# Patient Record
Sex: MALE | Race: WHITE | NOT HISPANIC OR LATINO | URBAN - METROPOLITAN AREA
[De-identification: names, ages, dates, MRNs, and addresses within clinical notes are randomized per-mention and may not be internally consistent; named-entity substitution may affect disease eponyms.]

---

## 2017-09-27 ENCOUNTER — OUTPATIENT (OUTPATIENT)
Dept: OUTPATIENT SERVICES | Facility: HOSPITAL | Age: 32
LOS: 1 days | Discharge: HOME | End: 2017-09-27

## 2017-09-29 DIAGNOSIS — F10.20 ALCOHOL DEPENDENCE, UNCOMPLICATED: ICD-10-CM

## 2021-04-11 ENCOUNTER — EMERGENCY (EMERGENCY)
Facility: HOSPITAL | Age: 36
LOS: 1 days | Discharge: HOME | End: 2021-04-11
Attending: STUDENT IN AN ORGANIZED HEALTH CARE EDUCATION/TRAINING PROGRAM | Admitting: STUDENT IN AN ORGANIZED HEALTH CARE EDUCATION/TRAINING PROGRAM
Payer: COMMERCIAL

## 2021-04-11 VITALS
SYSTOLIC BLOOD PRESSURE: 129 MMHG | RESPIRATION RATE: 16 BRPM | TEMPERATURE: 98 F | HEART RATE: 71 BPM | DIASTOLIC BLOOD PRESSURE: 87 MMHG | OXYGEN SATURATION: 99 %

## 2021-04-11 VITALS
RESPIRATION RATE: 16 BRPM | SYSTOLIC BLOOD PRESSURE: 156 MMHG | DIASTOLIC BLOOD PRESSURE: 106 MMHG | OXYGEN SATURATION: 98 % | TEMPERATURE: 98 F | HEART RATE: 77 BPM

## 2021-04-11 PROCEDURE — 99284 EMERGENCY DEPT VISIT MOD MDM: CPT

## 2021-04-11 RX ORDER — HUMAN RABIES VIRUS IMMUNE GLOBULIN 150 [IU]/ML
1400 INJECTION, SOLUTION INTRAMUSCULAR ONCE
Refills: 0 | Status: COMPLETED | OUTPATIENT
Start: 2021-04-11 | End: 2021-04-11

## 2021-04-11 RX ORDER — ACETAMINOPHEN 500 MG
650 TABLET ORAL ONCE
Refills: 0 | Status: COMPLETED | OUTPATIENT
Start: 2021-04-11 | End: 2021-04-11

## 2021-04-11 RX ORDER — TETANUS AND DIPHTHERIA TOXOIDS ADSORBED 2; 2 [LF]/.5ML; [LF]/.5ML
0.5 INJECTION INTRAMUSCULAR ONCE
Refills: 0 | Status: COMPLETED | OUTPATIENT
Start: 2021-04-11 | End: 2021-04-11

## 2021-04-11 RX ORDER — HUMAN RABIES VIRUS IMMUNE GLOBULIN 150 [IU]/ML
1400 INJECTION, SOLUTION INTRAMUSCULAR ONCE
Refills: 0 | Status: DISCONTINUED | OUTPATIENT
Start: 2021-04-11 | End: 2021-04-11

## 2021-04-11 RX ORDER — RABIES VACC, HUMAN DIPLOID/PF 2.5 UNIT
1 VIAL (EA) INTRAMUSCULAR ONCE
Refills: 0 | Status: COMPLETED | OUTPATIENT
Start: 2021-04-11 | End: 2021-04-11

## 2021-04-11 RX ADMIN — HUMAN RABIES VIRUS IMMUNE GLOBULIN 1400 UNIT(S): 150 INJECTION, SOLUTION INTRAMUSCULAR at 15:52

## 2021-04-11 RX ADMIN — Medication 1 MILLILITER(S): at 13:52

## 2021-04-11 RX ADMIN — TETANUS AND DIPHTHERIA TOXOIDS ADSORBED 0.5 MILLILITER(S): 2; 2 INJECTION INTRAMUSCULAR at 13:21

## 2021-04-11 RX ADMIN — Medication 1 TABLET(S): at 13:21

## 2021-04-11 RX ADMIN — Medication 650 MILLIGRAM(S): at 13:21

## 2021-04-11 NOTE — ED PROVIDER NOTE - PATIENT PORTAL LINK FT
You can access the FollowMyHealth Patient Portal offered by  by registering at the following website: [email or web address]. By joining [employer]’s FollowMyHealth portal, you will also be able to view your health information using other applications (apps) compatible with our system.

## 2021-04-11 NOTE — ED PROVIDER NOTE - CLINICAL SUMMARY MEDICAL DECISION MAKING FREE TEXT BOX
healthy 36M p/w unprovoked dog bite, pt is rabies immunized but dog's rabies vaccine recently . No evidence of bony injury, is neurovascularly intact. Plan to give prophylactic antibiotics, rabies vaccine, tetanus shot, discharge w/ instructions to return for repeat rabies immunization.

## 2021-04-11 NOTE — ED PROVIDER NOTE - NSFOLLOWUPINSTRUCTIONS_ED_ALL_ED_FT
You have received your initial dose of rabies vaccine on 4/11. Return for 3 further repeat doses after the following number of days: 3, 7, and 14.     Return to the ER immediately if your symptoms worsen, or experience the following issues: worsening pain, redness, and swelling to the wound sites, or drainage of yellow/brown pus.

## 2021-04-11 NOTE — ED ADULT TRIAGE NOTE - CHIEF COMPLAINT QUOTE
pt c/o pain to R hand s/p dog bite this morning. radial pulse intact, sensory intact, fingers with full ROM. hand appears swollen, 2 puncture wounds noted to hand, no active bleeding. no past medical hx.

## 2021-04-11 NOTE — ED PROVIDER NOTE - OBJECTIVE STATEMENT
36M w/out pmh - p/w R hand bite injury. Pt was talking with his neighbor and his neighbor's dog ran up and bit him in his R hand. Has had bleeding and pain since then. Pt was given information that the dog's rabies vaccine  21, but does not have any information about the dog's recent behavior / status. Pt reports previously received rabies immunization. No fever, n/v/d/c, chest / abd pain, cough, sob, dizziness, dysuria/hematuria. No recent travel, medication change, illness, or hospitalization.

## 2021-04-11 NOTE — ED PROVIDER NOTE - PHYSICAL EXAMINATION
*GEN:   uncomfortable, AOx3  *EYES:   pupils equally round and reactive to light, extra-occular movements intact  *HEENT:   airway patent  *CV:   regular rate and rhythm  *RESP:   non-labored  *EXTREM:   R hand dorsal puncture wound with TTP, significant overlying swelling, also smaller puncture to R thenar eminence. no erythema. R hand strength/sensation/perfusion intact throughout  *SKIN:   dry, intact  *NEURO:   AOx3, no focal weakness or loss of sensation

## 2021-04-14 ENCOUNTER — EMERGENCY (EMERGENCY)
Facility: HOSPITAL | Age: 36
LOS: 1 days | Discharge: HOME | End: 2021-04-14
Attending: EMERGENCY MEDICINE | Admitting: EMERGENCY MEDICINE
Payer: COMMERCIAL

## 2021-04-14 VITALS
TEMPERATURE: 98 F | OXYGEN SATURATION: 98 % | HEART RATE: 85 BPM | DIASTOLIC BLOOD PRESSURE: 65 MMHG | SYSTOLIC BLOOD PRESSURE: 135 MMHG | RESPIRATION RATE: 18 BRPM

## 2021-04-14 PROCEDURE — 99283 EMERGENCY DEPT VISIT LOW MDM: CPT

## 2021-04-14 RX ORDER — RABIES VACC, HUMAN DIPLOID/PF 2.5 UNIT
1 VIAL (EA) INTRAMUSCULAR ONCE
Refills: 0 | Status: COMPLETED | OUTPATIENT
Start: 2021-04-14 | End: 2021-04-14

## 2021-04-14 RX ADMIN — Medication 1 MILLILITER(S): at 09:08

## 2021-04-14 NOTE — ED PROVIDER NOTE - PATIENT PORTAL LINK FT
You can access the FollowMyHealth Patient Portal offered by [facility] by registering at the following website: [email or web address]. By joining [employer]’s FollowMyHealth portal, you will also be able to view your health information using other applications (apps) compatible with our system.

## 2021-04-14 NOTE — ED PROVIDER NOTE - PRINCIPAL DIAGNOSIS
Rik notified of Dr James's recommendations regarding his B/P and elevated pulse. He is in agreement to all. FBL appt scheduled.   Rabies, need for prophylactic vaccination against

## 2021-04-14 NOTE — ED PROVIDER NOTE - CLINICAL SUMMARY MEDICAL DECISION MAKING FREE TEXT BOX
- will dose 2nd rabies vaccine  - given return precautions for signs of cellulitis  - instructed to return for rest of series

## 2021-04-14 NOTE — ED PROVIDER NOTE - OBJECTIVE STATEMENT
36M denies pmh presents for second dose of rabies vaccine series.  Pt was bitten 3 days ago by neighbor's dog in right hand (dominant hand).  Event unprovoked, dog was vaccinated against rabies but reportedly vaccine .  Received his first rabies vaccine, rabies immunoglobulin, and tetanus shows in ED on prior visit.  Still with swelling, pain and redness of right hand but improved compared to previous day.  Taking Augmentin.

## 2021-04-14 NOTE — ED PROVIDER NOTE - NSFOLLOWUPINSTRUCTIONS_ED_ALL_ED_FT
Continue taking prescribed antibiotics.    Return to the emergency department for subsequent doses on days 7 and 14 from day of dog bite:    4/18 and 4/25    If swelling/redness/pain significantly worsen in right hand, please seek medical doctor for further evaluation as that may be a sign of infection.

## 2021-04-14 NOTE — ED ADULT TRIAGE NOTE - CHIEF COMPLAINT QUOTE
p/t s/p dog bite to rt hand few days ago, here for second rabies vaccine, c/o of mild swelling to rt hand, good cap refill noted

## 2021-04-15 ENCOUNTER — EMERGENCY (EMERGENCY)
Facility: HOSPITAL | Age: 36
LOS: 1 days | Discharge: HOME | End: 2021-04-15
Attending: EMERGENCY MEDICINE | Admitting: EMERGENCY MEDICINE
Payer: COMMERCIAL

## 2021-04-15 VITALS
SYSTOLIC BLOOD PRESSURE: 131 MMHG | HEART RATE: 100 BPM | OXYGEN SATURATION: 99 % | TEMPERATURE: 99 F | RESPIRATION RATE: 18 BRPM | DIASTOLIC BLOOD PRESSURE: 98 MMHG

## 2021-04-15 PROCEDURE — 99283 EMERGENCY DEPT VISIT LOW MDM: CPT

## 2021-04-15 NOTE — ED ADULT TRIAGE NOTE - CHIEF COMPLAINT QUOTE
Pt st" I was seen and treated for dog bit on rt hand on Sunday came back yesterday for wound and 2nd Rabies vaccine told to watch for infection the hand swelling seems to have gone down but I noticed reddness traveling up arm now past the wrist area...which yesterday it was not past wrist. "

## 2021-04-15 NOTE — ED PROVIDER NOTE - OBJECTIVE STATEMENT
Attending/Katerina: 37 yo M s/p bite to rt hand by neighbors dog. Rabies vaccination had  , ED started Rabies Ig/vaccine. Yesterday had received 2nd dose of vaccine. Tonight he presents for wound check over his wife's concern on whether erythema has increased. Pt reports decrease swelling, pain ad believes erythema has not worsen. Pt had received a 5 day course of Augmentin with last dose tonight.

## 2021-04-15 NOTE — ED PROVIDER NOTE - NSFOLLOWUPINSTRUCTIONS_ED_ALL_ED_FT
Continue Augmentin 875 mg twice a day for additional five days    Keep arm elevated     If redness worsens please return back to the emergency department    Please return for your next rabies vaccine dose and wound check

## 2021-04-15 NOTE — ED PROVIDER NOTE - PHYSICAL EXAMINATION
Attending/Katerina: Well-appearing, RRR, CTAB, Abd-soft, NT/ND; A&Ox3, nonfocal; RUE-+swelling of rt hand, erythema to prox wrist, no PT, FROM, sensory/motor intact

## 2021-04-15 NOTE — ED PROVIDER NOTE - PROGRESS NOTE DETAILS
Katerina: D/w dispo plan with patient including continuing abx, return for rabies vaccine, wound check. Area of erythema was marked with surgical marker

## 2021-04-15 NOTE — ED PROVIDER NOTE - PATIENT PORTAL LINK FT
You can access the FollowMyHealth Patient Portal offered by [facility] by registering at the following website: [email or web address]. By joining [employer]’s FollowMyHealth portal, you will also be able to view your health information using other applications (apps) compatible with our system.

## 2021-04-18 ENCOUNTER — EMERGENCY (EMERGENCY)
Facility: HOSPITAL | Age: 36
LOS: 1 days | Discharge: HOME | End: 2021-04-18
Attending: EMERGENCY MEDICINE | Admitting: EMERGENCY MEDICINE
Payer: COMMERCIAL

## 2021-04-18 VITALS
HEART RATE: 66 BPM | TEMPERATURE: 97 F | SYSTOLIC BLOOD PRESSURE: 117 MMHG | DIASTOLIC BLOOD PRESSURE: 77 MMHG | RESPIRATION RATE: 16 BRPM | OXYGEN SATURATION: 100 %

## 2021-04-18 PROBLEM — Z78.9 OTHER SPECIFIED HEALTH STATUS: Chronic | Status: ACTIVE | Noted: 2021-04-15

## 2021-04-18 PROCEDURE — 99283 EMERGENCY DEPT VISIT LOW MDM: CPT

## 2021-04-18 PROCEDURE — 73130 X-RAY EXAM OF HAND: CPT | Mod: 26,RT

## 2021-04-18 RX ORDER — RABIES VACC, HUMAN DIPLOID/PF 2.5 UNIT
1 VIAL (EA) INTRAMUSCULAR ONCE
Refills: 0 | Status: COMPLETED | OUTPATIENT
Start: 2021-04-18 | End: 2021-04-18

## 2021-04-18 RX ADMIN — Medication 1 MILLILITER(S): at 08:22

## 2021-04-18 NOTE — ED PROVIDER NOTE - OBJECTIVE STATEMENT
36m presents to ed for 3rd rabies vaccine. 36m presents to ed for 3rd rabies vaccine. Patient was bit by dog on 4/11/21, rec first rabies dose and then dose on day 3. Now here for day 7 dose. Patient states he intially had significant swelling/pain to region he was bitten. Has been slowly improving. Had his abx course extended to 10d on prior visit. Of note, he say his swelling has been improving, his pain has been improving, redness has been present and remains but does not extend out of borders of skin marker line drawn. No fevers, chills, numbness, tingling, difficulty moving fingers.

## 2021-04-18 NOTE — ED PROVIDER NOTE - CARE PROVIDER_API CALL
Lit Lin)  Orthopaedic Surgery; Surgery of the Hand  [address], [address]  [place] [address]  Phone: [phone]  Fax: [phone]  Follow Up Time:

## 2021-04-18 NOTE — ED PROVIDER NOTE - CLINICAL SUMMARY MEDICAL DECISION MAKING FREE TEXT BOX
Patient presents to the ED for third rabies vaccine in series (already received first 2 and rabies IG after a dog bite to r. hand. Has also been on augmentin 9 days. States his r. hand has been slowly improving in symptoms, redness still present but has not extended out of line previously drawn, pain has improved, swelling improving, range of motion improving since initial injury. no fevers, chills, vomiting. Given persistent redness, will add clindamycin coverage and recommend expeditious hand f/u. Given improvement of symptom overall, no systemic symptoms, nl vs, afebrile, no neuro or vascular deficits, will dc and give very strict return precautions. Patient presents to the ED for third rabies vaccine in series (already received first 2 and rabies IG after a dog bite to r. hand. Has also been on augmentin. States his r. hand has been slowly improving in symptoms, redness still present but has not extended out of line previously drawn, pain has improved, swelling improving, range of motion improving since initial injury. no fevers, chills, vomiting. Given persistent redness, will add clindamycin coverage, extend augmentin to full course, and recommend expeditious hand f/u. Given improvement of symptom overall, no systemic symptoms, nl vs, afebrile, no neuro or vascular deficits, will dc and give very strict return precautions.

## 2021-04-18 NOTE — ED PROVIDER NOTE - NSFOLLOWUPINSTRUCTIONS_ED_ALL_ED_FT
Please take ibuprofen 400mg every 6 hours as needed for pain with food.  Please take tylenol 650mg every 4 hours as needed for pain.  Please follow up with the hand specialist in the next few days for reevaluation. Information below. If unable to get appointment, please have your regular doctor or a health care provider reassess the region in the next 48 hours.  Please return if you develop worsening pain, spreading of redness, worsening swelling, fevers, chills, or any new/worse problem. You had an xr of your hand today in the ED.  Your results are included in this packet.  You also were given the third rabies vaccine, your antiobiotic course was extended to a full 14 day course, and you had an additional antibioitic added called clindamycin.   Please take these antibiotics as directed.  Please take ibuprofen 400mg every 6 hours as needed for pain with food.  Please take tylenol 650mg every 4 hours as needed for pain.  Please follow up with the hand specialist in the next few days for reevaluation. Information below. If unable to get appointment, please have your regular doctor or a health care provider reassess the region in the next 48 hours.  Please return if you develop worsening pain, spreading of redness, worsening swelling, fevers, chills, or any new/worse problem.

## 2021-04-18 NOTE — ED PROVIDER NOTE - NSPTACCESSSVCSAPPTDETAILS_ED_ALL_ED_FT
Hand specialist-dr. kartik WISEMAN   dog bite to r. hand. Hand specialist-dr. ana m WISEMAN   dog bite to r. hand.

## 2021-04-18 NOTE — ED ADULT NURSE NOTE - OBJECTIVE STATEMENT
Pt arrives to intake presenting for third rabies shot. Pt reports he was bit by dog in rt hand. Pt right hand swollen, pt currently waiting for xray results, medicated as per ordered, will continue to monitor.

## 2021-04-18 NOTE — ED PROVIDER NOTE - PHYSICAL EXAMINATION
A & O x 3, NAD, HEENT WNL and no facial asymmetry; breathing comfortably, r. hand with 2mm scab to mid dorsal mcp region, mild swelling surrounding, generally nontender, no region of focal fluctaunce, area with pale erythema receding from borders drawn with skin markers, cap refill <2s, tendon functions intact to hand, cap refill <2s, sensation intact

## 2021-04-18 NOTE — ED PROVIDER NOTE - PATIENT PORTAL LINK FT
You can access the FollowMyHealth Patient Portal offered by [facility] by registering at the following website: [email or web address]. By joining [employer]’s FollowMyHealth portal, you will also be able to view your health information using other applications (apps) compatible with our system.

## 2021-04-18 NOTE — ED PROVIDER NOTE - PROGRESS NOTE DETAILS
xr results d/w patient, copies given, hand specialist info given, instructed to f/u next few days for reassessment, return precautions discussed

## 2021-04-25 ENCOUNTER — EMERGENCY (EMERGENCY)
Facility: HOSPITAL | Age: 36
LOS: 1 days | Discharge: HOME | End: 2021-04-25
Attending: STUDENT IN AN ORGANIZED HEALTH CARE EDUCATION/TRAINING PROGRAM | Admitting: STUDENT IN AN ORGANIZED HEALTH CARE EDUCATION/TRAINING PROGRAM
Payer: COMMERCIAL

## 2021-04-25 VITALS
DIASTOLIC BLOOD PRESSURE: 92 MMHG | RESPIRATION RATE: 16 BRPM | SYSTOLIC BLOOD PRESSURE: 146 MMHG | OXYGEN SATURATION: 100 % | TEMPERATURE: 98 F | HEART RATE: 82 BPM

## 2021-04-25 PROCEDURE — 99283 EMERGENCY DEPT VISIT LOW MDM: CPT

## 2021-04-25 RX ORDER — RABIES VACC, HUMAN DIPLOID/PF 2.5 UNIT
1 VIAL (EA) INTRAMUSCULAR ONCE
Refills: 0 | Status: DISCONTINUED | OUTPATIENT
Start: 2021-04-25 | End: 2021-04-25

## 2021-04-25 RX ORDER — RABIES VACC, HUMAN DIPLOID/PF 2.5 UNIT
1 VIAL (EA) INTRAMUSCULAR ONCE
Refills: 0 | Status: COMPLETED | OUTPATIENT
Start: 2021-04-25 | End: 2021-04-25

## 2021-04-25 RX ADMIN — Medication 1 MILLILITER(S): at 14:02

## 2021-04-25 NOTE — ED PROVIDER NOTE - OBJECTIVE STATEMENT
36m p/w request for 4th rabies vaccine. Patient was bit by dog on 4/11/21.  pt received 3rd dose at [facility] ed . He states he has completed his antibiotics and improvement in his pain. He denies fever, chills, chest pain.

## 2021-04-25 NOTE — ED PROVIDER NOTE - PATIENT PORTAL LINK FT
You can access the FollowMyHealth Patient Portal offered by [facility] by registering at the following website: [email or web address]. By joining [employer]’s FollowMyHealth portal, you will also be able to view your health information using other applications (apps) compatible with our system.

## 2021-04-25 NOTE — ED PROVIDER NOTE - PHYSICAL EXAMINATION
Gen: Awake, Alert, WD, WN, NAD  Head:  NC/AT  Eyes:  PERRL, EOMI, Conjunctiva pink, lids normal, no scleral icterus  ENT: moist mucus membranes  Neck: supple, nontender, no meningismus, no JVD, trachea midline  Cardiac/CV:  S1 S2, RRR, no M/G/R  Respiratory/Pulm:  CTAB, good air movement, normal resp effort, no wheezes/stridor/retractions/rales/rhonchi  Gastrointestinal/Abdomen:  Soft, nontender, nondistended, +BS, no rebound/guarding  Ext:  warm, well perfused, moving all extremities spontaneously, no peripheral edema, distal pulses intact  Skin: bite w/o improvement  Neuro:  AAOx3, sensation intact, motor 5/5 x 4 extremities, normal gait, speech clear

## 2021-04-25 NOTE — ED ADULT TRIAGE NOTE - CHIEF COMPLAINT QUOTE
pt here for a rabies shot, was bit by a dog 2 weeks ago. no fevers/chills or redness to the site. some mild swelling noted. pt finished antibiotic 4 days ago. no pmhx

## 2021-04-25 NOTE — ED PROVIDER NOTE - PLAN OF CARE
You were seen for your 4th rabies vaccine. You were given the rabies vaccine. Follow up with your pmd. Return to the ED if you exhibit any new, continued or worsening symptoms.

## 2021-04-25 NOTE — ED PROVIDER NOTE - CLINICAL SUMMARY MEDICAL DECISION MAKING FREE TEXT BOX
36m presents to ed for 3rd rabies vaccine. Patient was bit by dog on 4/11/21, rec first rabies dose and then dose on day 3. Now here for day 7 dose. Patient states he intially had significant swelling/pain to region he was bitten.

## 2021-04-25 NOTE — ED PROVIDER NOTE - CARE PLAN
Principal Discharge DX:	Dog bite  Assessment and plan of treatment:	You were seen for your 4th rabies vaccine. You were given the rabies vaccine. Follow up with your pmd. Return to the ED if you exhibit any new, continued or worsening symptoms.

## 2022-07-27 ENCOUNTER — APPOINTMENT (OUTPATIENT)
Dept: DERMATOLOGY | Facility: CLINIC | Age: 37
End: 2022-07-27

## 2022-10-02 PROBLEM — Z00.00 ENCOUNTER FOR PREVENTIVE HEALTH EXAMINATION: Status: ACTIVE | Noted: 2022-10-02

## 2022-10-05 ENCOUNTER — APPOINTMENT (OUTPATIENT)
Dept: UROLOGY | Facility: CLINIC | Age: 37
End: 2022-10-05

## 2022-10-20 ENCOUNTER — APPOINTMENT (OUTPATIENT)
Dept: UROLOGY | Facility: CLINIC | Age: 37
End: 2022-10-20

## 2022-10-20 VITALS
HEART RATE: 71 BPM | DIASTOLIC BLOOD PRESSURE: 80 MMHG | RESPIRATION RATE: 16 BRPM | TEMPERATURE: 98.6 F | OXYGEN SATURATION: 96 % | HEIGHT: 69 IN | SYSTOLIC BLOOD PRESSURE: 114 MMHG | BODY MASS INDEX: 27.4 KG/M2 | WEIGHT: 185 LBS

## 2022-10-20 PROCEDURE — 99204 OFFICE O/P NEW MOD 45 MIN: CPT

## 2022-10-20 NOTE — ASSESSMENT
[FreeTextEntry1] : 37-year-old male who desires vasectomy\par I counseled the patient extensively today with regard to vasectomy. I discussed the potential risks and benefits of the procedure with the patient including the potential for bleeding and infection. I also discussed the fact that this procedure should be considered irreversible and if there is any question in the patient's mind, I have encouraged him to reconsider his decision to move forward with the procedure. I also discussed the fact that he may remain temporarily fertile for a period of up to 3 months after undergoing the procedure and that a post vasectomy semen analysis would be required to confirm the absence of sperm in the ejaculate.\par \par I discussed options of performing the procedure either under local anesthesia in the office versus under a more general IV sedation in the operating room. He has decided to move forward with the procedure under local anesthesia and I will schedule that at the earliest available time.  During the visit today, the patient signed the initial consent form with 30-day lead time for the procedure understanding that if he does ultimately decide to change his mind regarding vasectomy, he can do so at any time. \par -Schedule for vasectomy at patient's earliest convenience after 30-day wait time

## 2022-10-20 NOTE — PHYSICAL EXAM
[Normal Appearance] : normal appearance [Well Groomed] : well groomed [Edema] : no peripheral edema [Exaggerated Use Of Accessory Muscles For Inspiration] : no accessory muscle use [Abdomen Tenderness] : non-tender [Costovertebral Angle Tenderness] : no ~M costovertebral angle tenderness [Urethral Meatus] : meatus normal [Epididymis] : the epididymides were normal [Testes Tenderness] : no tenderness of the testes [Testes Mass (___cm)] : there were no testicular masses [FreeTextEntry1] : Bilateral descended testicles, loose scrotum, vasa palpated [Normal Station and Gait] : the gait and station were normal for the patient's age [] : no rash [No Focal Deficits] : no focal deficits [Oriented To Time, Place, And Person] : oriented to person, place, and time

## 2022-12-08 ENCOUNTER — APPOINTMENT (OUTPATIENT)
Dept: DERMATOLOGY | Facility: CLINIC | Age: 37
End: 2022-12-08

## 2022-12-08 ENCOUNTER — APPOINTMENT (OUTPATIENT)
Dept: UROLOGY | Facility: CLINIC | Age: 37
End: 2022-12-08

## 2023-01-24 ENCOUNTER — APPOINTMENT (OUTPATIENT)
Dept: UROLOGY | Facility: CLINIC | Age: 38
End: 2023-01-24

## 2023-01-27 ENCOUNTER — EMERGENCY (EMERGENCY)
Facility: HOSPITAL | Age: 38
LOS: 1 days | Discharge: HOME | End: 2023-01-27
Attending: STUDENT IN AN ORGANIZED HEALTH CARE EDUCATION/TRAINING PROGRAM | Admitting: STUDENT IN AN ORGANIZED HEALTH CARE EDUCATION/TRAINING PROGRAM
Payer: COMMERCIAL

## 2023-01-27 VITALS
RESPIRATION RATE: 16 BRPM | DIASTOLIC BLOOD PRESSURE: 96 MMHG | OXYGEN SATURATION: 98 % | SYSTOLIC BLOOD PRESSURE: 140 MMHG | HEART RATE: 91 BPM | TEMPERATURE: 98 F

## 2023-01-27 PROCEDURE — 99284 EMERGENCY DEPT VISIT MOD MDM: CPT | Mod: 25,57

## 2023-01-27 PROCEDURE — 26770 TREAT FINGER DISLOCATION: CPT | Mod: 54

## 2023-01-27 PROCEDURE — 29125 APPL SHORT ARM SPLINT STATIC: CPT | Mod: 59

## 2023-01-27 RX ORDER — OXYCODONE HYDROCHLORIDE 5 MG/1
5 TABLET ORAL ONCE
Refills: 0 | Status: DISCONTINUED | OUTPATIENT
Start: 2023-01-27 | End: 2023-01-27

## 2023-01-27 RX ORDER — IBUPROFEN 200 MG
800 TABLET ORAL ONCE
Refills: 0 | Status: COMPLETED | OUTPATIENT
Start: 2023-01-27 | End: 2023-01-27

## 2023-01-27 NOTE — ED ADULT TRIAGE NOTE - CHIEF COMPLAINT QUOTE
Pt arrives to ED s/p hand injury while playing basketball.  Right hand appears swollen with some deformity to pinky finger compared to unaffected hand.  Pt denies blood thinners.

## 2023-01-28 VITALS
OXYGEN SATURATION: 98 % | HEART RATE: 66 BPM | RESPIRATION RATE: 16 BRPM | DIASTOLIC BLOOD PRESSURE: 76 MMHG | TEMPERATURE: 98 F | SYSTOLIC BLOOD PRESSURE: 122 MMHG

## 2023-01-28 PROCEDURE — 73130 X-RAY EXAM OF HAND: CPT | Mod: 26,RT

## 2023-01-28 PROCEDURE — 73140 X-RAY EXAM OF FINGER(S): CPT | Mod: 26,59,RT

## 2023-01-28 RX ORDER — LIDOCAINE HCL 20 MG/ML
20 VIAL (ML) INJECTION ONCE
Refills: 0 | Status: COMPLETED | OUTPATIENT
Start: 2023-01-28 | End: 2023-01-28

## 2023-01-28 RX ADMIN — OXYCODONE HYDROCHLORIDE 5 MILLIGRAM(S): 5 TABLET ORAL at 00:04

## 2023-01-28 RX ADMIN — Medication 800 MILLIGRAM(S): at 00:04

## 2023-01-28 RX ADMIN — OXYCODONE HYDROCHLORIDE 5 MILLIGRAM(S): 5 TABLET ORAL at 01:23

## 2023-01-28 RX ADMIN — Medication 800 MILLIGRAM(S): at 01:24

## 2023-01-28 NOTE — ED ADULT NURSE NOTE - OBJECTIVE STATEMENT
37 year old male, received to [facility]. Pt A&Ox4, ambulatory. Respirations equal and unlabored. Pt c/o right hand pain after falling while playing basketball. Pt states he tripped and fell and landed on his left hand. Pt 5th digit noted to be red and swollen. +2 pulse and sensation. Pt denies head trauma or any other injuries. Medicated as EMR orders. Pending XR. Will continue to monitor. 37 year old male, received to [facility]. Pt A&Ox4, ambulatory. Respirations equal and unlabored. Pt c/o right hand pain after falling while playing basketball. Pt states he tripped and fell and landed on his left hand. Pt R 5th digit noted to be red and swollen. +2 pulse and sensation. Pt denies head trauma or any other injuries. Medicated as EMR orders. Pending XR. Will continue to monitor.

## 2023-01-28 NOTE — ED PROVIDER NOTE - PATIENT PORTAL LINK FT
You can access the FollowMyHealth Patient Portal offered by [facility] by registering at the following website: [email or web address]. By joining [employer]’s FollowMyHealth portal, you will also be able to view your health information using other applications (apps) compatible with our system.

## 2023-01-28 NOTE — ED PROVIDER NOTE - NSFOLLOWUPINSTRUCTIONS_ED_ALL_ED_FT
You were seen today for dislocation of your right pinky.  The pinky was put back into place, and you were placed in a splint.    Take 500-1000mg acetaminophen (tylenol) every 6-8 hours as needed  Take 400-600mg ibuprofen (advil or motrin) every 6-8 hours as needed, take with food    Please follow-up with hand surgery as discussed.  Do not get your dressings wet    Please return to the Emergency Department should you experience any of the following:  - Loss of sensation in pinky  - Pain uncontrolled by over the counter meds  - Any new concerning symptoms

## 2023-01-28 NOTE — ED PROVIDER NOTE - PHYSICAL EXAMINATION
GENERAL: non-toxic appearing, alert, in NAD  HEENT: atraumatic, normocephalic, Vision grossly intact, no conjunctivitis or discharge, hearing grossly intact,  CARDIAC: Well perfused peripherally, warm extremities, pulses 2+ b/l upper extremities  PULM: normal work of breathing, oxygen saturation on RA wnl, no audible wheeze or stridor   GI: abdomen nondistended, soft, nontender, no guarding or rebound tenderness, no palpable masses  NEURO: awake and alert, follows commands, normal speech, PERRLA, EOMI, no focal motor or sensory deficits  MSK: R hand w FROM and wrist, 5th digit with metacarpal tenderness and dorsal deformity at MCP, tenderness along 5th digit proximal and distal, intact flexion, no snuffbox tenderness  EXT: no peripheral edema  SKIN: warm, dry, and intact, no rashes  PSYCH: appropriate mood and affect

## 2023-01-28 NOTE — ED PROVIDER NOTE - OBJECTIVE STATEMENT
36yo M R-handed presenting to ED with Right hand injury while playing basketball.  Patient reports ran to another player fell down on his right hand immediately suffered right pinky pain and hand swelling.  Injury occurred 2 to 3 hours ago, swelling has been persistent pain severe, able to range pinky.  No other medical problems, no analgesics prior to arrival.

## 2023-01-28 NOTE — ED PROVIDER NOTE - CLINICAL SUMMARY MEDICAL DECISION MAKING FREE TEXT BOX
Right-handed male with closed right fifth digit injury, dorsal deformity concerning for metacarpal fracture or dislocation at fifth MCP of right hand.  Will give pain meds, get x-rays and reassess. 36 y/o M with no PMH Right-handed male with closed right fifth digit injury, dorsal deformity concerning for metacarpal fracture or dislocation at fifth MCP of right hand.  Will give pain meds, get x-rays and reassess.Patient will require splinting and follow-up with hand surgery. Pain control. No signs of skin breakdown. No indication for tetanus shot at this time

## 2023-01-28 NOTE — ED PROVIDER NOTE - ATTENDING CONTRIBUTION TO CARE
36 y/o M with no PMH p/w right hand pain s/p fall. pt states he fell onto his right hand and has pain in his 5th metacarpal. pt w/ swelling in the right hand near the 5th metacarpal. denies LOC, nausea, vomiting, dizziness. Patient states that he was playing basketball last night. Impact another player and fell onto the ground. Reports having pain and swelling to the right hand near the fifth metacarpal. Patient reports pain improved with medications and arrival to the ED denies fever, chills, head injury. Patient with visibly swollen right hand and tenderness over the fifth metacarpal  denies fever, chills, chest pain, SOB, abdominal pain, diarrhea, dysuria, syncope, bleeding, new rash,weakness, numbness, blurred vision    ROS  otherwise negative as per HPI  Gen: Awake, Alert, WD, WN, NAD  Head:  NC/AT  Eyes:  PERRL, EOMI, Conjunctiva pink, lids normal, no scleral icterus  ENT: , moist mucus membranes  Neck: supple, nontender, no meningismus, no JVD, trachea midline  Cardiac/CV:  S1 S2, RRR, no M/G/R  Respiratory/Pulm:  CTAB, good air movement, normal resp effort, no wheezes/stridor/retractions/rales/rhonchi  Gastrointestinal/Abdomen:  Soft, nontender, nondistended, +BS, no rebound/guarding  Back:  no CVAT, no MLT  Ext:  warm, well perfused, moving all extremities spontaneously, no peripheral edema, distal pulses intact swelling over 5th metacarpal w/ tenderness , prominence over distal 5th metacarpal   Skin: intact, no rash  Neuro:  AAOx3, sensation intact, motor 5/5 x 4 extremities, normal gait, speech clear

## 2023-01-28 NOTE — ED PROVIDER NOTE - PROGRESS NOTE DETAILS
Lee Larson, PGY-3: Pt reexamined at bedside, resting comfortably, vitals stable. X-ray demonstrated dislocation at the MCP of the right fifth digit.  Patient was offered digital block for reduction however declined, reduction done at bedside without complication and placed in ulnar splint.  Sensation is intact fingers normal color, full range of motion full flexion.  Will have follow-up with hand surgery

## 2023-04-24 ENCOUNTER — OUTPATIENT (OUTPATIENT)
Dept: OUTPATIENT SERVICES | Facility: HOSPITAL | Age: 38
LOS: 1 days | Discharge: HOME | End: 2023-04-24

## 2023-04-25 DIAGNOSIS — F12.20 CANNABIS DEPENDENCE, UNCOMPLICATED: ICD-10-CM

## 2023-04-25 DIAGNOSIS — F33.1 MAJOR DEPRESSIVE DISORDER, RECURRENT, MODERATE: ICD-10-CM

## 2023-04-25 DIAGNOSIS — F10.20 ALCOHOL DEPENDENCE, UNCOMPLICATED: ICD-10-CM

## 2023-12-04 NOTE — ED ADULT TRIAGE NOTE - DOMESTIC TRAVEL HIGH RISK QUESTION
Follow Up:  Pre-OHT    Interval History: blood cultures resulted with GPR. afebrile. notes persistence in rash.     REVIEW OF SYSTEMS  [  ] ROS unobtainable because:    [x  ] All other systems negative except as noted below    Constitutional:  [ ] fever [ ] chills  [ ] weight loss  [ ] weakness  Skin:  [ x] rash [ ] phlebitis	  Eyes: [ ] icterus [ ] pain  [ ] discharge	  ENMT: [ ] sore throat  [ ] thrush [ ] ulcers [ ] exudates  Respiratory: [ ] dyspnea [ ] hemoptysis [ ] cough [ ] sputum	  Cardiovascular:  [ ] chest pain [ ] palpitations [ ] edema	  Gastrointestinal:  [ ] nausea [ ] vomiting [ ] diarrhea [ ] constipation [ ] pain	  Genitourinary:  [ ] dysuria [ ] frequency [ ] hematuria [ ] discharge [ ] flank pain  [ ] incontinence  Musculoskeletal:  [ ] myalgias [ ] arthralgias [ ] arthritis  [ ] back pain  Neurological:  [ ] headache [ ] seizures  [ ] confusion/altered mental status    Allergies  penicillins (Unknown)        ANTIMICROBIALS:      OTHER MEDS:  MEDICATIONS  (STANDING):  aMIOdarone    Tablet    aMIOdarone    Tablet 200 daily  aspirin  chewable 81 daily  atorvastatin 80 at bedtime  budesonide  80 MICROgram(s)/formoterol 4.5 MICROgram(s) Inhaler 2 two times a day  carvedilol 25 every 12 hours  heparin  Infusion 1300 <Continuous>  hydrALAZINE 100 three times a day  hydrOXYzine hydrochloride 25 two times a day PRN  insulin glargine Injectable (LANTUS) 12 at bedtime  insulin lispro (ADMELOG) corrective regimen sliding scale  three times a day before meals  insulin lispro (ADMELOG) corrective regimen sliding scale  at bedtime  insulin lispro Injectable (ADMELOG) 9 three times a day before meals  isosorbide   dinitrate Tablet (ISORDIL) 40 three times a day  polyethylene glycol 3350 17 two times a day  senna 2 at bedtime      Vital Signs Last 24 Hrs  T(C): 36.9 (04 Dec 2023 16:00), Max: 36.9 (03 Dec 2023 19:00)  T(F): 98.4 (04 Dec 2023 16:00), Max: 98.5 (03 Dec 2023 19:00)  HR: 80 (04 Dec 2023 17:00) (71 - 83)  BP: 113/55 (03 Dec 2023 19:00) (113/55 - 113/55)  BP(mean): --  RR: 20 (04 Dec 2023 17:00) (13 - 25)  SpO2: 99% (04 Dec 2023 17:00) (96% - 100%)    Parameters below as of 04 Dec 2023 17:00  Patient On (Oxygen Delivery Method): room air        PHYSICAL EXAMINATION:  General: Alert and Awake, NAD  Cardiac: RRR, No M/R/G  Resp: CTAB, No Wh/Rh/Ra  Abdomen: NBS, NT/ND, No HSM, No rigidity or guarding  MSK: No LE edema. No Calf tenderness  Vasc: R Balloon Pump (No surrounding erythema, drainage or tenderness to palpation)  Skin: Maculopapular rash on UE, UE and to lesser degree the chest. Skin is warm and dry to the touch.   Neuro: Alert and Awake. CN 2-12 Grossly intact. Moves all four extremities spontaneously.  Psych: Calm, Pleasant, Cooperative                          11.6   7.33  )-----------( 133      ( 04 Dec 2023 00:40 )             34.7       12-04    132<L>  |  102  |  26<H>  ----------------------------<  201<H>  3.8   |  19<L>  |  1.12    Ca    9.5      04 Dec 2023 14:42  Phos  2.9     12-04  Mg     1.8     12-04    TPro  6.5  /  Alb  3.6  /  TBili  0.2  /  DBili  x   /  AST  40  /  ALT  97<H>  /  AlkPhos  59  12-04      Urinalysis Basic - ( 04 Dec 2023 14:42 )    Color: x / Appearance: x / SG: x / pH: x  Gluc: 201 mg/dL / Ketone: x  / Bili: x / Urobili: x   Blood: x / Protein: x / Nitrite: x   Leuk Esterase: x / RBC: x / WBC x   Sq Epi: x / Non Sq Epi: x / Bacteria: x        MICROBIOLOGY:  v  .Blood Blood  11-30-23   Growth in anaerobic bottle: Gram Positive Rods  Direct identification is available within approximately 3-5  hours either by Blood Panel Multiplexed PCR or Direct  MALDI-TOF. Details: [email or web address]  --  Blood Culture PCR      .Blood Blood-Venous  11-29-23   No growth at 5 days  --  --      .Blood Blood-Peripheral  11-18-23   No growth at 5 days  --  --      .Blood Blood-Peripheral  11-18-23   No growth at 5 days  --  --      Clean Catch Clean Catch (Midstream)  11-18-23   10,000 - 49,000 CFU/mL Enterococcus faecalis  <10,000 CFU/ml Normal Urogenital kaitlynn present  --  Enterococcus faecalis      .Blood Blood  11-17-23   Growth in aerobic and anaerobic bottles: Enterococcus faecalis  See previous culture [id number]  Growth in aerobic bottle: Staphylococcus epidermidis  --  Staphylococcus epidermidis      .Blood Blood  11-17-23   Growth in aerobic bottle: Enterococcus faecalis  Growth in anaerobic bottle: Staphylococcus epidermidis "Susceptibilities  not performed"  Direct identification is available within approximately 3-5  hours either by Blood Panel Multiplexed PCR or Direct  MALDI-TOF. Details: [email or web address]  --  Blood Culture PCR  Blood Culture PCR  Enterococcus faecalis      .Blood Blood-Peripheral  11-10-23   No growth at 5 days  --  --        CMV IgG Antibody: 4.20 U/mL (11-10-23 @ 17:29)  Toxoplasma IgG Screen: <3.0 IU/mL (11-10-23 @ 17:29)  HIV-1 RNA Quantitative, Viral Load Log: NOT DET. lg /mL (11-10-23 @ 17:06)          RADIOLOGY:    <The imaging below has been reviewed and visualized by me independently. Findings as detailed in report below>    < from: Xray Chest 1 View- PORTABLE-Routine (Xray Chest 1 View- PORTABLE-Routine in AM.) (12.04.23 @ 03:47) >  IMPRESSION:  Marker as noted.    < end of copied text >   Follow Up:  Pre-OHT    Interval History: blood cultures resulted with GPR. afebrile. notes persistence in rash.     REVIEW OF SYSTEMS  [  ] ROS unobtainable because:    [x  ] All other systems negative except as noted below    Constitutional:  [ ] fever [ ] chills  [ ] weight loss  [ ] weakness  Skin:  [ x] rash [ ] phlebitis	  Eyes: [ ] icterus [ ] pain  [ ] discharge	  ENMT: [ ] sore throat  [ ] thrush [ ] ulcers [ ] exudates  Respiratory: [ ] dyspnea [ ] hemoptysis [ ] cough [ ] sputum	  Cardiovascular:  [ ] chest pain [ ] palpitations [ ] edema	  Gastrointestinal:  [ ] nausea [ ] vomiting [ ] diarrhea [ ] constipation [ ] pain	  Genitourinary:  [ ] dysuria [ ] frequency [ ] hematuria [ ] discharge [ ] flank pain  [ ] incontinence  Musculoskeletal:  [ ] myalgias [ ] arthralgias [ ] arthritis  [ ] back pain  Neurological:  [ ] headache [ ] seizures  [ ] confusion/altered mental status    Allergies  penicillins (Unknown)        ANTIMICROBIALS:      OTHER MEDS:  MEDICATIONS  (STANDING):  aMIOdarone    Tablet    aMIOdarone    Tablet 200 daily  aspirin  chewable 81 daily  atorvastatin 80 at bedtime  budesonide  80 MICROgram(s)/formoterol 4.5 MICROgram(s) Inhaler 2 two times a day  carvedilol 25 every 12 hours  heparin  Infusion 1300 <Continuous>  hydrALAZINE 100 three times a day  hydrOXYzine hydrochloride 25 two times a day PRN  insulin glargine Injectable (LANTUS) 12 at bedtime  insulin lispro (ADMELOG) corrective regimen sliding scale  three times a day before meals  insulin lispro (ADMELOG) corrective regimen sliding scale  at bedtime  insulin lispro Injectable (ADMELOG) 9 three times a day before meals  isosorbide   dinitrate Tablet (ISORDIL) 40 three times a day  polyethylene glycol 3350 17 two times a day  senna 2 at bedtime      Vital Signs Last 24 Hrs  T(C): 36.9 (04 Dec 2023 16:00), Max: 36.9 (03 Dec 2023 19:00)  T(F): 98.4 (04 Dec 2023 16:00), Max: 98.5 (03 Dec 2023 19:00)  HR: 80 (04 Dec 2023 17:00) (71 - 83)  BP: 113/55 (03 Dec 2023 19:00) (113/55 - 113/55)  BP(mean): --  RR: 20 (04 Dec 2023 17:00) (13 - 25)  SpO2: 99% (04 Dec 2023 17:00) (96% - 100%)    Parameters below as of 04 Dec 2023 17:00  Patient On (Oxygen Delivery Method): room air        PHYSICAL EXAMINATION:  General: Alert and Awake, NAD  Cardiac: RRR, No M/R/G  Resp: CTAB, No Wh/Rh/Ra  Abdomen: NBS, NT/ND, No HSM, No rigidity or guarding  MSK: No LE edema. No Calf tenderness  Vasc: R Balloon Pump (No surrounding erythema, drainage or tenderness to palpation)  Skin: Maculopapular rash on UE, UE and to lesser degree the chest. Skin is warm and dry to the touch.   Neuro: Alert and Awake. CN 2-12 Grossly intact. Moves all four extremities spontaneously.  Psych: Calm, Pleasant, Cooperative                          11.6   7.33  )-----------( 133      ( 04 Dec 2023 00:40 )             34.7       12-04    132<L>  |  102  |  26<H>  ----------------------------<  201<H>  3.8   |  19<L>  |  1.12    Ca    9.5      04 Dec 2023 14:42  Phos  2.9     12-04  Mg     1.8     12-04    TPro  6.5  /  Alb  3.6  /  TBili  0.2  /  DBili  x   /  AST  40  /  ALT  97<H>  /  AlkPhos  59  12-04      Urinalysis Basic - ( 04 Dec 2023 14:42 )    Color: x / Appearance: x / SG: x / pH: x  Gluc: 201 mg/dL / Ketone: x  / Bili: x / Urobili: x   Blood: x / Protein: x / Nitrite: x   Leuk Esterase: x / RBC: x / WBC x   Sq Epi: x / Non Sq Epi: x / Bacteria: x        MICROBIOLOGY:  v  .Blood Blood  11-30-23   Growth in anaerobic bottle: Gram Positive Rods  Direct identification is available within approximately 3-5  hours either by Blood Panel Multiplexed PCR or Direct  MALDI-TOF. Details: [email or web address]  --  Blood Culture PCR      .Blood Blood-Venous  11-29-23   No growth at 5 days  --  --      .Blood Blood-Peripheral  11-18-23   No growth at 5 days  --  --      .Blood Blood-Peripheral  11-18-23   No growth at 5 days  --  --      Clean Catch Clean Catch (Midstream)  11-18-23   10,000 - 49,000 CFU/mL Enterococcus faecalis  <10,000 CFU/ml Normal Urogenital kaitlynn present  --  Enterococcus faecalis      .Blood Blood  11-17-23   Growth in aerobic and anaerobic bottles: Enterococcus faecalis  See previous culture [id number]  Growth in aerobic bottle: Staphylococcus epidermidis  --  Staphylococcus epidermidis      .Blood Blood  11-17-23   Growth in aerobic bottle: Enterococcus faecalis  Growth in anaerobic bottle: Staphylococcus epidermidis "Susceptibilities  not performed"  Direct identification is available within approximately 3-5  hours either by Blood Panel Multiplexed PCR or Direct  MALDI-TOF. Details: [email or web address]  --  Blood Culture PCR  Blood Culture PCR  Enterococcus faecalis      .Blood Blood-Peripheral  11-10-23   No growth at 5 days  --  --        CMV IgG Antibody: 4.20 U/mL (11-10-23 @ 17:29)  Toxoplasma IgG Screen: <3.0 IU/mL (11-10-23 @ 17:29)  HIV-1 RNA Quantitative, Viral Load Log: NOT DET. lg /mL (11-10-23 @ 17:06)          RADIOLOGY:    <The imaging below has been reviewed and visualized by me independently. Findings as detailed in report below>    < from: Xray Chest 1 View- PORTABLE-Routine (Xray Chest 1 View- PORTABLE-Routine in AM.) (12.04.23 @ 03:47) >  IMPRESSION:  Marker as noted.    < end of copied text >   No

## 2024-02-27 ENCOUNTER — APPOINTMENT (OUTPATIENT)
Dept: UROLOGY | Facility: CLINIC | Age: 39
End: 2024-02-27
Payer: COMMERCIAL

## 2024-02-27 VITALS
RESPIRATION RATE: 16 BRPM | BODY MASS INDEX: 27.4 KG/M2 | TEMPERATURE: 98 F | HEIGHT: 69 IN | DIASTOLIC BLOOD PRESSURE: 83 MMHG | SYSTOLIC BLOOD PRESSURE: 121 MMHG | OXYGEN SATURATION: 98 % | WEIGHT: 185 LBS | HEART RATE: 59 BPM

## 2024-02-27 DIAGNOSIS — Z30.09 ENCOUNTER FOR OTHER GENERAL COUNSELING AND ADVICE ON CONTRACEPTION: ICD-10-CM

## 2024-02-27 PROCEDURE — 55250 REMOVAL OF SPERM DUCT(S): CPT

## 2024-03-06 LAB — CORE LAB BIOPSY: NORMAL

## 2025-01-19 ENCOUNTER — NON-APPOINTMENT (OUTPATIENT)
Age: 40
End: 2025-01-19